# Patient Record
Sex: MALE | Race: WHITE
[De-identification: names, ages, dates, MRNs, and addresses within clinical notes are randomized per-mention and may not be internally consistent; named-entity substitution may affect disease eponyms.]

---

## 2021-10-15 ENCOUNTER — HOSPITAL ENCOUNTER (EMERGENCY)
Dept: HOSPITAL 47 - EC | Age: 51
Discharge: HOME | End: 2021-10-15
Payer: COMMERCIAL

## 2021-10-15 VITALS
HEART RATE: 56 BPM | TEMPERATURE: 97.9 F | SYSTOLIC BLOOD PRESSURE: 149 MMHG | DIASTOLIC BLOOD PRESSURE: 85 MMHG | RESPIRATION RATE: 16 BRPM

## 2021-10-15 DIAGNOSIS — Z87.891: ICD-10-CM

## 2021-10-15 DIAGNOSIS — R42: Primary | ICD-10-CM

## 2021-10-15 DIAGNOSIS — F12.90: ICD-10-CM

## 2021-10-15 DIAGNOSIS — Z72.89: ICD-10-CM

## 2021-10-15 LAB
ALBUMIN SERPL-MCNC: 4.1 G/DL (ref 3.5–5)
ALP SERPL-CCNC: 69 U/L (ref 38–126)
ALT SERPL-CCNC: 19 U/L (ref 4–49)
ANION GAP SERPL CALC-SCNC: 8 MMOL/L
AST SERPL-CCNC: 20 U/L (ref 17–59)
BASOPHILS # BLD AUTO: 0.1 K/UL (ref 0–0.2)
BASOPHILS NFR BLD AUTO: 1 %
BUN SERPL-SCNC: 10 MG/DL (ref 9–20)
CALCIUM SPEC-MCNC: 9.3 MG/DL (ref 8.4–10.2)
CHLORIDE SERPL-SCNC: 103 MMOL/L (ref 98–107)
CO2 SERPL-SCNC: 28 MMOL/L (ref 22–30)
EOSINOPHIL # BLD AUTO: 0.1 K/UL (ref 0–0.7)
EOSINOPHIL NFR BLD AUTO: 2 %
ERYTHROCYTE [DISTWIDTH] IN BLOOD BY AUTOMATED COUNT: 4.85 M/UL (ref 4.3–5.9)
ERYTHROCYTE [DISTWIDTH] IN BLOOD: 12 % (ref 11.5–15.5)
GLUCOSE SERPL-MCNC: 86 MG/DL (ref 74–99)
HCT VFR BLD AUTO: 46.8 % (ref 39–53)
HGB BLD-MCNC: 15.7 GM/DL (ref 13–17.5)
INR PPP: 1 (ref ?–1.2)
LYMPHOCYTES # SPEC AUTO: 2.9 K/UL (ref 1–4.8)
LYMPHOCYTES NFR SPEC AUTO: 33 %
MAGNESIUM SPEC-SCNC: 1.8 MG/DL (ref 1.6–2.3)
MCH RBC QN AUTO: 32.5 PG (ref 25–35)
MCHC RBC AUTO-ENTMCNC: 33.6 G/DL (ref 31–37)
MCV RBC AUTO: 96.6 FL (ref 80–100)
MONOCYTES # BLD AUTO: 0.6 K/UL (ref 0–1)
MONOCYTES NFR BLD AUTO: 7 %
NEUTROPHILS # BLD AUTO: 4.9 K/UL (ref 1.3–7.7)
NEUTROPHILS NFR BLD AUTO: 56 %
PLATELET # BLD AUTO: 299 K/UL (ref 150–450)
POTASSIUM SERPL-SCNC: 4 MMOL/L (ref 3.5–5.1)
PROT SERPL-MCNC: 7 G/DL (ref 6.3–8.2)
PT BLD: 10.9 SEC (ref 9–12)
SODIUM SERPL-SCNC: 139 MMOL/L (ref 137–145)
WBC # BLD AUTO: 8.8 K/UL (ref 3.8–10.6)

## 2021-10-15 PROCEDURE — 36415 COLL VENOUS BLD VENIPUNCTURE: CPT

## 2021-10-15 PROCEDURE — 84484 ASSAY OF TROPONIN QUANT: CPT

## 2021-10-15 PROCEDURE — 85025 COMPLETE CBC W/AUTO DIFF WBC: CPT

## 2021-10-15 PROCEDURE — 85610 PROTHROMBIN TIME: CPT

## 2021-10-15 PROCEDURE — 70450 CT HEAD/BRAIN W/O DYE: CPT

## 2021-10-15 PROCEDURE — 99284 EMERGENCY DEPT VISIT MOD MDM: CPT

## 2021-10-15 PROCEDURE — 83735 ASSAY OF MAGNESIUM: CPT

## 2021-10-15 PROCEDURE — 84443 ASSAY THYROID STIM HORMONE: CPT

## 2021-10-15 PROCEDURE — 80053 COMPREHEN METABOLIC PANEL: CPT

## 2021-10-15 PROCEDURE — 93005 ELECTROCARDIOGRAM TRACING: CPT

## 2021-10-15 NOTE — CT
EXAMINATION TYPE: CT brain wo con

 

DATE OF EXAM: 10/15/2021

 

HISTORY: Dizziness, vision issues

 

CT DLP: 1129.4 mGycm.  Automated Exposure Control for Dose Reduction was Utilized.

 

TECHNIQUE: CT scan of the head is performed without contrast.

 

COMPARISON: None.

 

FINDINGS:   There is no acute intracranial hemorrhage or midline shift identified. Ventricles and sul
ci within normal limits in size for patient's age. Gray-white matter differentiation fairly well main
tained. The globes are intact and the visualized sinuses are clear.   No suspicious opacification of 
the mastoid air cells bilaterally.

 

IMPRESSION:  No acute intracranial hemorrhage or midline shift.

## 2021-10-15 NOTE — ED
General Adult HPI





- General


Chief complaint: Eye Problems


Stated complaint: Eye Problems


Time Seen by Provider: 10/15/21 14:50


Source: patient, RN notes reviewed


Mode of arrival: ambulatory


Limitations: no limitations





- History of Present Illness


Initial comments: 





Patient is a 51-year-old male presenting to the emergency Department with 

complaints of feeling lightheaded/dizzy over the past week.  He states he has 

been to his doctor twice now for this issue, had normal labs, he was prescribed 

something for upper respiratory symptoms.  Patient states he then went to his 

eye doctor yesterday, eye exam.  This was his first eye exam in over 30 years.  

He states they found swelling on both of hos optic nerve and told him he needs 

to go to the ER for an MRI.  He denies any blurry vision or changes in his 

vision.  He denies having a headache, no falls or trauma recently.  He denies 

any fever or chills, no neck pain.  He denies any chest pain or shortness of 

breath.  Denies any palpitations.  He states he has been under a lot of stress 

lately with recently losing both of his parents and buying a new house.  He does

not take any medications.  Patient has not had this type of thing in the past.  

He has no further complaints.  Upon arrival to the ER his vital signs are 

stable.





- Related Data


                                Home Medications











 Medication  Instructions  Recorded  Confirmed


 


No Known Home Medications  10/15/21 10/15/21











                                    Allergies











Allergy/AdvReac Type Severity Reaction Status Date / Time


 


Penicillins Allergy  Anaphylaxis Verified 10/15/21 16:11


 


Sulfa (Sulfonamide Allergy  Unknown Verified 10/15/21 16:11





Antibiotics)     














Review of Systems


ROS Statement: 


Those systems with pertinent positive or pertinent negative responses have been 

documented in the HPI.





ROS Other: All systems not noted in ROS Statement are negative.





Past Medical History


Past Medical History: No Reported History


Past Surgical History: Orthopedic Surgery


Past Psychological History: No Psychological Hx Reported


Smoking Status: Former smoker


Past Alcohol Use History: Occasional


Past Drug Use History: Marijuana





General Exam





- General Exam Comments


Initial Comments: 





GENERAL: 


Patient is well-developed and well-nourished.  Patient is nontoxic and in no 

acute distress.





HEAD: 


Atraumatic, normocephalic.





EYES:


Pupils equal round and reactive to light, extraocular movements intact, sclera 

anicteric, conjunctiva are normal.  Eyelids were unremarkable.





ENT: 


TMs normal, nares patent, oropharynx clear without exudates.  Moist mucous 

membranes.





NECK: 


Normal range of motion, supple without lymphadenopathy or JVD.





LUNGS:


Unlabored respirations.  Breath sounds clear to auscultation bilaterally and 

equal.  No wheezes rales or rhonchi.





HEART:


Regular rate and rhythm without murmurs, rubs or gallops.





ABDOMEN: 


Soft, nontender, normoactive bowel sounds.  No guarding, no rebound.  No masses 

appreciated.





: Deferred 





MUSCULOSKELETAL: 


Normal extremities with adequate strength and normal range of motion, no pitting

or edema.  No clubbing or cyanosis.





NEUROLOGICAL: 


Patient is alert and oriented x 3.  Motor and sensory are also intact.  Cranial 

nerves II through XII grossly intact.  Symmetrical smile.  Normal speech, normal

gait.   





PSYCH:


Normal mood, normal affect.





SKIN:


 Warm, Dry, normal turgor, no rashes or lesions noted.


Limitations: no limitations





Course


                                   Vital Signs











  10/15/21 10/15/21





  13:29 17:56


 


Temperature 97.7 F 97.9 F


 


Pulse Rate 55 L 56 L


 


Respiratory 18 16





Rate  


 


Blood Pressure 152/93 149/85


 


O2 Sat by Pulse 98 97





Oximetry  














EKG Findings





- EKG Comments:


EKG Findings:: Marked sinus bradycardia, no signs of acute ST segment elevation.

 Ventricular rate 44, CO interval 192, .





Medical Decision Making





- Medical Decision Making





Patient is a 51-year-old male presenting with lightheaded/dizziness over the 

past week.  No falls or trauma.  He takes no medications.  His vitals are stable

upon arrival.  He did have an eye exam yesterday, this was his first one in over

30 years, they stated they found bilateral papilledema with eye pressures from 

20-22 and recommended coming into the ER for an MRI.  He denies any blurry 

vision or changes in vision.  He denies having a headache.  Exam is 

unremarkable, no acute neuro deficits.  Labs are all within normal limits 

including normal troponin.  EKG does show marked sinus bradycardia with 

ventricular rate of 44.  CT of the brain showed no acute process at this time.  

I spoke with Dr. Sorenson and he is comfortable patient being discharged home and 

will follow-up in his office.  I discussed these findings with the patient.  He 

is comfortable with this as well.  He will follow up with ophthalmologist next 

week.  Return parameters were discussed with him and he verbalized 

understanding.  Case discussed with Dr. Red.  





- Lab Data


Result diagrams: 


                                 10/15/21 15:20





                                 10/15/21 15:20


                                   Lab Results











  10/15/21 10/15/21 10/15/21 Range/Units





  15:20 15:20 15:20 


 


WBC  8.8    (3.8-10.6)  k/uL


 


RBC  4.85    (4.30-5.90)  m/uL


 


Hgb  15.7    (13.0-17.5)  gm/dL


 


Hct  46.8    (39.0-53.0)  %


 


MCV  96.6    (80.0-100.0)  fL


 


MCH  32.5    (25.0-35.0)  pg


 


MCHC  33.6    (31.0-37.0)  g/dL


 


RDW  12.0    (11.5-15.5)  %


 


Plt Count  299    (150-450)  k/uL


 


MPV  8.5    


 


Neutrophils %  56    %


 


Lymphocytes %  33    %


 


Monocytes %  7    %


 


Eosinophils %  2    %


 


Basophils %  1    %


 


Neutrophils #  4.9    (1.3-7.7)  k/uL


 


Lymphocytes #  2.9    (1.0-4.8)  k/uL


 


Monocytes #  0.6    (0-1.0)  k/uL


 


Eosinophils #  0.1    (0-0.7)  k/uL


 


Basophils #  0.1    (0-0.2)  k/uL


 


PT   10.9   (9.0-12.0)  sec


 


INR   1.0   (<1.2)  


 


Sodium    139  (137-145)  mmol/L


 


Potassium    4.0  (3.5-5.1)  mmol/L


 


Chloride    103  ()  mmol/L


 


Carbon Dioxide    28  (22-30)  mmol/L


 


Anion Gap    8  mmol/L


 


BUN    10  (9-20)  mg/dL


 


Creatinine    0.79  (0.66-1.25)  mg/dL


 


Est GFR (CKD-EPI)AfAm    >90  (>60 ml/min/1.73 sqM)  


 


Est GFR (CKD-EPI)NonAf    >90  (>60 ml/min/1.73 sqM)  


 


Glucose    86  (74-99)  mg/dL


 


Calcium    9.3  (8.4-10.2)  mg/dL


 


Magnesium    1.8  (1.6-2.3)  mg/dL


 


Total Bilirubin    0.3  (0.2-1.3)  mg/dL


 


AST    20  (17-59)  U/L


 


ALT    19  (4-49)  U/L


 


Alkaline Phosphatase    69  ()  U/L


 


Troponin I     (0.000-0.034)  ng/mL


 


Total Protein    7.0  (6.3-8.2)  g/dL


 


Albumin    4.1  (3.5-5.0)  g/dL


 


TSH    4.020  (0.465-4.680)  mIU/L














  10/15/21 Range/Units





  15:20 


 


WBC   (3.8-10.6)  k/uL


 


RBC   (4.30-5.90)  m/uL


 


Hgb   (13.0-17.5)  gm/dL


 


Hct   (39.0-53.0)  %


 


MCV   (80.0-100.0)  fL


 


MCH   (25.0-35.0)  pg


 


MCHC   (31.0-37.0)  g/dL


 


RDW   (11.5-15.5)  %


 


Plt Count   (150-450)  k/uL


 


MPV   


 


Neutrophils %   %


 


Lymphocytes %   %


 


Monocytes %   %


 


Eosinophils %   %


 


Basophils %   %


 


Neutrophils #   (1.3-7.7)  k/uL


 


Lymphocytes #   (1.0-4.8)  k/uL


 


Monocytes #   (0-1.0)  k/uL


 


Eosinophils #   (0-0.7)  k/uL


 


Basophils #   (0-0.2)  k/uL


 


PT   (9.0-12.0)  sec


 


INR   (<1.2)  


 


Sodium   (137-145)  mmol/L


 


Potassium   (3.5-5.1)  mmol/L


 


Chloride   ()  mmol/L


 


Carbon Dioxide   (22-30)  mmol/L


 


Anion Gap   mmol/L


 


BUN   (9-20)  mg/dL


 


Creatinine   (0.66-1.25)  mg/dL


 


Est GFR (CKD-EPI)AfAm   (>60 ml/min/1.73 sqM)  


 


Est GFR (CKD-EPI)NonAf   (>60 ml/min/1.73 sqM)  


 


Glucose   (74-99)  mg/dL


 


Calcium   (8.4-10.2)  mg/dL


 


Magnesium   (1.6-2.3)  mg/dL


 


Total Bilirubin   (0.2-1.3)  mg/dL


 


AST   (17-59)  U/L


 


ALT   (4-49)  U/L


 


Alkaline Phosphatase   ()  U/L


 


Troponin I  <0.012  (0.000-0.034)  ng/mL


 


Total Protein   (6.3-8.2)  g/dL


 


Albumin   (3.5-5.0)  g/dL


 


TSH   (0.465-4.680)  mIU/L














Disposition


Clinical Impression: 


 Dizziness





Disposition: HOME SELF-CARE


Condition: Stable


Instructions (If sedation given, give patient instructions):  Dizziness (ED)


Additional Instructions: 


Please return to the Emergency Department if symptoms worsen or any other 

concerns.


Follow-up with ophthalmology as discussed.


Is patient prescribed a controlled substance at d/c from ED?: No


Referrals: 


Nonstaff,Physician [Primary Care Provider] - 1-2 days


Osmar Sorenson MD [STAFF PHYSICIAN] - 1-2 days


Time of Disposition: 17:35

## 2022-02-03 ENCOUNTER — HOSPITAL ENCOUNTER (OUTPATIENT)
Dept: HOSPITAL 47 - LABWHC1 | Age: 52
Discharge: HOME | End: 2022-02-03
Attending: ORTHOPAEDIC SURGERY
Payer: COMMERCIAL

## 2022-02-03 DIAGNOSIS — M16.0: Primary | ICD-10-CM

## 2022-02-03 DIAGNOSIS — M25.561: ICD-10-CM

## 2022-02-03 DIAGNOSIS — M51.9: ICD-10-CM

## 2022-02-03 DIAGNOSIS — Z96.653: ICD-10-CM
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
## 2022-02-03 DIAGNOSIS — M25.562: ICD-10-CM

## 2022-02-03 DIAGNOSIS — T84.033A: ICD-10-CM

## 2022-02-03 LAB
BASOPHILS # BLD AUTO: 0.08 X 10*3/UL (ref 0–0.1)
BASOPHILS NFR BLD AUTO: 0.5 %
CELL CNT PNL FLD: 100
CELL CNT PNL FLD: 100
DEPRECATED POLYS # FLD: 3 %
DEPRECATED POLYS # FLD: 9 %
EOSINOPHIL # BLD AUTO: 0.04 X 10*3/UL (ref 0.04–0.35)
EOSINOPHIL NFR BLD AUTO: 0.3 %
ERYTHROCYTE [DISTWIDTH] IN BLOOD BY AUTOMATED COUNT: 5.05 X 10*6/UL (ref 4.4–5.6)
ERYTHROCYTE [DISTWIDTH] IN BLOOD: 12.7 % (ref 11.5–14.5)
HCT VFR BLD AUTO: 48.1 % (ref 39.6–50)
HGB BLD-MCNC: 15.8 G/DL (ref 13–17)
LYMPHOCYTES # SPEC AUTO: 3.54 X 10*3/UL (ref 0.9–5)
LYMPHOCYTES NFR SPEC AUTO: 23.4 %
MCH RBC QN AUTO: 31.3 PG (ref 27–32)
MCHC RBC AUTO-ENTMCNC: 32.8 G/DL (ref 32–37)
MCV RBC AUTO: 95.2 FL (ref 80–97)
MONOCYTES # BLD AUTO: 1.14 X 10*3/UL (ref 0.2–1)
MONOCYTES NFR BLD AUTO: 7.5 %
MONONUC CELLS # FLD: 91 %
MONONUC CELLS # FLD: 97 %
NEUTROPHILS # BLD AUTO: 10.03 X 10*3/UL (ref 1.8–7.7)
NEUTROPHILS NFR BLD AUTO: 66.3 %
NUC CELL # FLD: 150 /UL
NUC CELL # FLD: 2650 /UL
PLATELET # BLD AUTO: 406 X 10*3/UL (ref 140–440)
WBC # BLD AUTO: 15.14 X 10*3/UL (ref 4.5–10)

## 2022-02-03 PROCEDURE — 86140 C-REACTIVE PROTEIN: CPT

## 2022-02-03 PROCEDURE — 85652 RBC SED RATE AUTOMATED: CPT

## 2022-02-03 PROCEDURE — 85025 COMPLETE CBC W/AUTO DIFF WBC: CPT

## 2022-02-03 PROCEDURE — 87075 CULTR BACTERIA EXCEPT BLOOD: CPT

## 2022-02-03 PROCEDURE — 89060 EXAM SYNOVIAL FLUID CRYSTALS: CPT

## 2022-02-03 PROCEDURE — 87070 CULTURE OTHR SPECIMN AEROBIC: CPT

## 2022-02-03 PROCEDURE — 87205 SMEAR GRAM STAIN: CPT

## 2022-02-03 PROCEDURE — 89050 BODY FLUID CELL COUNT: CPT

## 2022-02-03 PROCEDURE — 85379 FIBRIN DEGRADATION QUANT: CPT

## 2022-02-03 PROCEDURE — 36415 COLL VENOUS BLD VENIPUNCTURE: CPT

## 2023-10-24 ENCOUNTER — HOSPITAL ENCOUNTER (OUTPATIENT)
Dept: HOSPITAL 47 - RADXRMAIN | Age: 53
Discharge: HOME | End: 2023-10-24
Attending: FAMILY MEDICINE
Payer: COMMERCIAL

## 2023-10-24 DIAGNOSIS — M54.50: Primary | ICD-10-CM

## 2023-10-24 PROCEDURE — 72110 X-RAY EXAM L-2 SPINE 4/>VWS: CPT

## 2023-10-24 NOTE — XR
EXAMINATION TYPE: XR lumbosacral spine min 4V

 

DATE OF EXAM: 10/24/2023

 

CLINICAL HISTORY: pain

 

COMPARISON: NONE

 

TECHNIQUE: Frontal, lateral, and oblique images of the lumbar spine are obtained.

 

FINDINGS:  There are 5 lumbar type vertebral bodies identified.  The lumbar spine shows satisfactory 
alignment without evidence of acute fracture or dislocation. Vertebral body heights are within normal
 limits. Moderate to severe degenerative degenerative disc space narrowing greatest at L5-S1 with vac
uum disc seen. There is ventral and dorsal spondylosis. Facet joint arthropathy seen.   The overlying
 soft tissue appears unremarkable.

 

IMPRESSION:  No acute fracture or dislocation is seen in the lumbar spine.ICD 10 NO FRACTURE, INITIAL
 EVALUATION